# Patient Record
Sex: FEMALE | Race: BLACK OR AFRICAN AMERICAN | URBAN - METROPOLITAN AREA
[De-identification: names, ages, dates, MRNs, and addresses within clinical notes are randomized per-mention and may not be internally consistent; named-entity substitution may affect disease eponyms.]

---

## 2017-03-30 LAB
ANTIBODY SCREEN, EXTERNAL: NEGATIVE
HBSAG, EXTERNAL: NEGATIVE
HIV, EXTERNAL: NEGATIVE
RPR, EXTERNAL: NORMAL
RUBELLA, EXTERNAL: NORMAL
TYPE, ABO & RH, EXTERNAL: NORMAL

## 2017-04-19 ENCOUNTER — HOSPITAL ENCOUNTER (EMERGENCY)
Age: 37
Discharge: HOME | End: 2017-04-19
Attending: STUDENT IN AN ORGANIZED HEALTH CARE EDUCATION/TRAINING PROGRAM
Payer: COMMERCIAL

## 2017-04-19 VITALS
BODY MASS INDEX: 28.93 KG/M2 | HEART RATE: 88 BPM | SYSTOLIC BLOOD PRESSURE: 111 MMHG | OXYGEN SATURATION: 97 % | RESPIRATION RATE: 16 BRPM | TEMPERATURE: 97.9 F | HEIGHT: 66 IN | WEIGHT: 180 LBS | DIASTOLIC BLOOD PRESSURE: 67 MMHG

## 2017-04-19 DIAGNOSIS — R10.84 ABDOMINAL PAIN, GENERALIZED: Primary | ICD-10-CM

## 2017-04-19 PROCEDURE — 99282 EMERGENCY DEPT VISIT SF MDM: CPT
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
## 2017-04-19 NOTE — DISCHARGE INSTRUCTIONS
We hope that we have addressed all of your medical concerns. The examination and treatment you received in the Emergency Department were for an emergent problem and were not intended as complete care. It is important that you follow up with your healthcare provider(s) for ongoing care. If your symptoms worsen or do not improve as expected, and you are unable to reach your usual health care provider(s), you should return to the Emergency Department. Today's healthcare is undergoing tremendous change, and patient satisfaction surveys are one of the many tools to assess the quality of medical care. You may receive a survey from the [employer] regarding your experience in the Emergency Department. I hope that your experience has been completely positive, particularly the medical care that I provided. As such, please participate in the survey; anything less than excellent does not meet my expectations or intentions. [facility] and [address] [place] [address] participate in nationally recognized quality of care measures. If your blood pressure is greater than 120/80, as reported below, we urge that you seek medical care to address the potential of high blood pressure, commonly known as hypertension. Hypertension can be hereditary or can be caused by certain medical conditions, pain, stress, or \"white coat syndrome. \"       Please make an appointment with your health care provider(s) for follow up of your Emergency Department visit. VITALS:   Patient Vitals for the past 8 hrs:   Temp Pulse Resp BP SpO2   04/19/17 1300 97.9 °F (36.6 °C) 88 16 111/67 97 %          Thank you for allowing us to provide you with medical care today. We realize that you have many choices for your emergency care needs. Please choose us in the future for any continued health care needs. Sanchez Ellis  [place], [address] [place] [address] [address]   Office: [phone]            No results found for this or any previous visit (from the past 24 hour(s)). No results found. Belly Pain in Pregnancy: Care Instructions  Your Care Instructions  When you're pregnant, any belly pain can be a worry. You may not want to call your doctor about every pain you have. But you don't want to miss something that is dangerous for you or your baby. Even if it feels familiar, belly pain can mean something new when you're pregnant. It's important to know when to call your doctor. It will also help to know how to care for yourself at home when your pain is not caused by anything harmful. · When belly pain is more severe or constant, see a doctor right away. · If you're sure your belly pain is a sign of labor, call your doctor. · When belly pain is brief, it's usually a normal part of pregnancy. It might be related to changes in the growing uterus. Or it could be the stretching of ligaments called round ligaments. These ligaments help support the uterus. Round ligament pain can be on either side of your belly. It can also be felt in your hips or groin. Follow-up care is a key part of your treatment and safety. Be sure to make and go to all appointments, and call your doctor if you are having problems. It's also a good idea to know your test results and keep a list of the medicines you take. How can you tell if belly pain is a sign of labor? When belly pain is caused by labor, it can feel like mild or menstrual-like cramps in your lower belly. These cramps are probably contractions. They can happen in your second or third trimester. You may also have:  · A steady, dull ache in your lower back, pelvis, or thighs. · A feeling of pressure in your pelvis or lower belly. · Changes in your vaginal discharge or a sudden release of fluid from the vagina. If you think you are in labor, call your doctor. How can you care for yourself at home?   When belly pain is mild and is not a symptom of labor:  · Rest until you feel better. · Take a warm bath. · Think about what you drink and eat:  ¨ Drink plenty of fluids. Choose water and other caffeine-free clear liquids until you feel better. ¨ Try eating small, frequent meals. If your stomach is upset, try bland, low-fat foods like plain rice, broiled chicken, toast, and yogurt. · Think about how you move if you are having brief pains from stretching of the round ligaments. ¨ Try gentle stretching. ¨ Move a little more slowly when turning in bed or getting up from a chair, so those ligaments don't stretch quickly. ¨ Lean forward a bit if you think you are going to cough or sneeze. When should you call for help? Call 911 anytime you think you may need emergency care. For example, call if:  · You have sudden, severe pain in your belly. · You have severe vaginal bleeding. Call your doctor now or seek immediate medical care if:  · You have new or worse belly pain or cramping. · You have any vaginal bleeding. · You have a fever. · You have symptoms of preeclampsia, such as:  ¨ Sudden swelling of your face, hands, or feet. ¨ New vision problems (such as dimness or blurring). ¨ A severe headache. · You think that you may be in labor. This means that you've had at least 8 contractions within 1 hour or at least 4 contractions within 20 minutes, even after you change your position and drink fluids. · You have symptoms of a urinary tract infection. These may include:  ¨ Pain or burning when you urinate. ¨ A frequent need to urinate without being able to pass much urine. ¨ Pain in the flank, which is just below the rib cage and above the waist on either side of the back. ¨ Blood in your urine. Watch closely for changes in your health, and be sure to contact your doctor if you are worried about your or your baby's health. Where can you learn more? Go to http://danette-ben.info/.   Enter [phone] in the search box to learn more about \"Belly Pain in Pregnancy: Care Instructions. \"  Current as of: June 8, 2016  Content Version: 11.2  © 4679-6268 [employer], [employer]. Care instructions adapted under license by [employer] (which disclaims liability or warranty for this information). If you have questions about a medical condition or this instruction, always ask your healthcare professional. [address] any warranty or liability for your use of this information.

## 2017-05-11 ENCOUNTER — HOSPITAL ENCOUNTER (INPATIENT)
Age: 37
LOS: 1 days | Discharge: HOME | DRG: 778 | End: 2017-05-12
Attending: OBSTETRICS & GYNECOLOGY | Admitting: OBSTETRICS & GYNECOLOGY
Payer: COMMERCIAL

## 2017-05-11 LAB
ABO + RH BLD: NORMAL
BLOOD GROUP ANTIBODIES SERPL: NORMAL
ERYTHROCYTE [DISTWIDTH] IN BLOOD BY AUTOMATED COUNT: 13.9 % (ref 11.5–14.5)
HCT VFR BLD AUTO: 36.7 % (ref 35–47)
HGB BLD-MCNC: 12.3 G/DL (ref 11.5–16)
MCH RBC QN AUTO: 28.7 PG (ref 26–34)
MCHC RBC AUTO-ENTMCNC: 33.5 G/DL (ref 30–36.5)
MCV RBC AUTO: 85.7 FL (ref 80–99)
PLATELET # BLD AUTO: 289 K/UL (ref 150–400)
RBC # BLD AUTO: 4.28 M/UL (ref 3.8–5.2)
SPECIMEN EXP DATE BLD: NORMAL
WBC # BLD AUTO: 12 K/UL (ref 3.6–11)

## 2017-05-11 PROCEDURE — 86900 BLOOD TYPING SEROLOGIC ABO: CPT | Performed by: OBSTETRICS & GYNECOLOGY

## 2017-05-11 PROCEDURE — 65410000002 HC RM PRIVATE OB

## 2017-05-11 PROCEDURE — 36415 COLL VENOUS BLD VENIPUNCTURE: CPT | Performed by: OBSTETRICS & GYNECOLOGY

## 2017-05-11 PROCEDURE — 85027 COMPLETE CBC AUTOMATED: CPT | Performed by: OBSTETRICS & GYNECOLOGY

## 2017-05-11 PROCEDURE — 74011250636 HC RX REV CODE- 250/636: Performed by: OBSTETRICS & GYNECOLOGY

## 2017-05-11 RX ORDER — ONDANSETRON 4 MG/1
4 TABLET, ORALLY DISINTEGRATING ORAL
Status: DISCONTINUED | OUTPATIENT
Start: 2017-05-11 | End: 2017-05-12 | Stop reason: HOSPADM

## 2017-05-11 RX ORDER — ACETAMINOPHEN 325 MG/1
975 TABLET ORAL
Status: DISCONTINUED | OUTPATIENT
Start: 2017-05-11 | End: 2017-05-12 | Stop reason: HOSPADM

## 2017-05-11 RX ORDER — SODIUM CHLORIDE 0.9 % (FLUSH) 0.9 %
SYRINGE (ML) INJECTION
Status: DISPENSED
Start: 2017-05-11 | End: 2017-05-12

## 2017-05-11 RX ORDER — SODIUM CHLORIDE 0.9 % (FLUSH) 0.9 %
SYRINGE (ML) INJECTION
Status: COMPLETED
Start: 2017-05-11 | End: 2017-05-11

## 2017-05-11 RX ORDER — LEVOTHYROXINE SODIUM 137 UG/1
100 TABLET ORAL
COMMUNITY

## 2017-05-11 RX ORDER — SODIUM CHLORIDE, SODIUM LACTATE, POTASSIUM CHLORIDE, CALCIUM CHLORIDE 600; 310; 30; 20 MG/100ML; MG/100ML; MG/100ML; MG/100ML
125 INJECTION, SOLUTION INTRAVENOUS CONTINUOUS
Status: DISCONTINUED | OUTPATIENT
Start: 2017-05-11 | End: 2017-05-11

## 2017-05-11 RX ADMIN — Medication 10 ML: at 15:12

## 2017-05-11 RX ADMIN — SODIUM CHLORIDE, SODIUM LACTATE, POTASSIUM CHLORIDE, AND CALCIUM CHLORIDE 125 ML/HR: 600; 310; 30; 20 INJECTION, SOLUTION INTRAVENOUS at 12:37

## 2017-05-11 NOTE — IP AVS SNAPSHOT
Current Discharge Medication List  
  
CONTINUE these medications which have NOT CHANGED Dose & Instructions Dispensing Information Comments Morning Noon Evening Bedtime  
 levothyroxine 137 mcg tablet Commonly known as:  SYNTHROID Your last dose was: Your next dose is:    
   
   
 Dose:  100 mcg Take 100 mcg by mouth Daily (before breakfast). Indications: hypothyroidism Refills:  0 PRENATAL PLUS (CALCIUM CARB) 27 mg iron- 1 mg Tab Generic drug:  prenatal vit-calcium-iron-fa Your last dose was: Your next dose is:    
   
   
 Dose:  1 Tab Take 1 Tab by mouth daily. Indications: Pregnancy Refills:  0

## 2017-05-11 NOTE — PROGRESS NOTES
TRANSFER - IN REPORT:    Verbal report received from ANGELO Barron RN(name) on [employer]  being received from L&D(unit) for routine progression of care      Report consisted of patients Situation, Background, Assessment and   Recommendations(SBAR). Information from the following report(s) SBAR, Intake/Output, MAR and Recent Results was reviewed with the receiving nurse. Opportunity for questions and clarification was provided. Assessment completed upon patients arrival to unit and care assumed. 1455  Patient returned to room 315 from Dr. Aiyana Wilhelm office via wheelchair. 1500  Patient admitted to Antepartum unit. Call bell at bedside. All questions answered. Awaiting tray from dietary.  on his way back to be with patient (went to get food). Hourly rounds were completed on this patient [phone], [phone].

## 2017-05-11 NOTE — PROGRESS NOTES
1130: Patient arrived from office with vaginal bleeding. FHR was 106 in office. Assessing now. 1245: Dr. Jacobs Reasons in room to see patient. Ultrasound performed. FHR visible, around 100bpm. No amniotic fluid seen. Verbal orders to transfer to APU and continue to watch bleeding. Verbal orders to eat something light. 1340: Patient to Dr. Lisa Sullivan office for ultrasound, then to APU.     1415: TRANSFER - OUT REPORT:    Telephone report given to JOSE Doran (name) on [address]  being transferred to APU (unit) for routine progression of care       Report consisted of patients Situation, Background, Assessment and   Recommendations(SBAR). Information from the following report(s) SBAR, Procedure Summary, Intake/Output, MAR and Recent Results was reviewed with the receiving nurse.

## 2017-05-11 NOTE — CONSULTS
Maternal-Fetal Medicine    Indication: Antepartum hemorrhage, unspecified, first trimester O46.91, AMA Multigravida 1st Tri 009.521, Recurrent Preg. Loss (preg) 1st Tri  O26.21.   ____________________________________________________________________________  History: Age: 42 years. Maternal age at [facility]: 42 years. ____________________________________________________________________________  Dating:  Stated EDC:  EDC: 11/11/17 GA by stated EDC: 13w5d  Current Scan on: 05/11/17 EDC: 11/15/17 GA by current scan: 13w1d  Best Overall Assessment: 05/11/17 EDC: 11/11/17 Assessed GA: 13w5d  The calculation of the gestational age by current scan was based on [address]. The Best Overall Assessment is based on the stated EDC.  ____________________________________________________________________________  First Trimester Scan:  Rizvi gestation. Biometry:  CRL 68.7 mm  - 13w1d (12w0d to 14w1d)     Fetal heart activity: present. Fetal heart rate: 168 bpm.     Summary of Ultrasound Findings:  U/S machine: Virdiaon E8 Expert.     ____________________________________________________________________________  Report Summary:  Impression:   Ms Yomi Hernandez, [facility] at 13-weeks gestation, is admitted with vaginal bleeding. On bedside ultrasound, intermittent bradycardia and decreased or no amniotic fluid was seen. Obstetric history is significant for a term vaginal delivery 22 years ago. Recently, she had a 16-week loss followed by 4 early miscarriages. She reports 2 of them could be chemical pregnancies. Patient has hypothyroidism and takes supplements. On ultrasound, amniotic fluid is decreased. A single intrauterine pregnancy is confirmed. Fetal heart rate ranges from 167 to 175 bpm. No episodes of bradycardia were seen. The placenta is anterior and there is no subchorionic hematoma. The [address] measurement is consistent with her previously-established dates. I informed the couple of the findings.  I explained the poor prognosis of continuing the pregnancy to viability because of low or absent amniotic fluid. The likelihood of miscarriage increases with vaginal bleeding. Her hematocrit is 36.7. If the bleeding is profuse or continuous, I recommend termination of pregnancy to prevent adverse maternal outcomes. If bleeding stops, the patient may be discharged and followed up in a week or as clinically indicated. A 24-hour observation as inpatient is reasonable. Informed Dr. Arlene Cuevas

## 2017-05-11 NOTE — H&P
History & Physical    Name: Swetha Wiseman MRN: [id number]  SSN: [id number]    YOB: 1980  Age: 42 y.o. Sex: female      Subjective:     Reason for Admission:  Pregnancy and bleeding    History of Present Illness: Ms. Alia Cruz is a 42 y.o. female with an estimated gestational age of 12w10d with Estimated Date of Delivery: 17. Patient complains of large amount of bleeding with clots this morning - since admission has had scant bleeding. Mild cramps now only. OB History    Para Term  AB SAB TAB Ectopic Multiple Living   7 1 1  1 5    1      # Outcome Date GA Lbr Emmett/2nd Weight Sex Delivery Anes PTL Lv   7 Current            6 SAB            5 SAB            4 SAB            3 SAB            2 SAB  16 wks          1 Term 18 yrs ago                Past Medical History:   Diagnosis Date    Thyroid activity decreased      Past Surgical History:   Procedure Laterality Date    HX OTHER SURGICAL      D&C  - retained placenta     Social History     Occupational History    Not on file. Social History Main Topics    Smoking status: Never Smoker    Smokeless tobacco: Not on file    Alcohol use No    Drug use: No    Sexual activity: Yes      No family history on file. Allergies   Allergen Reactions    Penicillins Unknown (comments)     Told by mother she was allergic     Prior to Admission medications    Medication Sig Start Date End Date Taking? Authorizing Provider   levothyroxine (SYNTHROID) 137 mcg tablet Take 100 mcg by mouth Daily (before breakfast). Indications: hypothyroidism   Yes Historical Provider   prenatal vit-calcium-iron-fa (PRENATAL PLUS, CALCIUM CARB,) 27 mg iron- 1 mg tab Take 1 Tab by mouth daily. Indications: Pregnancy   Yes Historical Provider        Review of Systems:  A comprehensive review of systems was negative except for that written in the History of Present Illness.      Objective:     Vitals:    Vitals:    17 1140 17 1200 17 1500   BP:  120/76 112/71   Pulse:  68 69   Resp:  16 16   Temp:  98.1 °F (36.7 °C) 98.5 °F (36.9 °C)   Weight: 84.8 kg (187 lb)     Height: 5' 6\" (1.676 m)        Temp (24hrs), Av.3 °F (36.8 °C), Min:98.1 °F (36.7 °C), Max:98.5 °F (36.9 °C)    BP  Min: 112/71  Max: 120/76     Physical Exam:  Patient without distress. Heart: Regular rate and rhythm  Lung: clear to auscultation throughout lung fields, no wheezes, no rales, no rhonchi and normal respiratory effort  Abdomen: soft, nontender  Fundus: soft and non tender  Perineum: blood present, amniotic fluid absent  Cervical Exam: ext os 1cm in office/no effacement; 100cc clot evacuated in office  Lower Extremities:  - Edema No     Membranes:  No amniotic fluid on US  Uterine Activity:  none  Fetal Heart Rate:  +FHT on US       Lab/Data Review:  Recent Results (from the past 24 hour(s))   CBC W/O DIFF    Collection Time: 17 12:35 PM   Result Value Ref Range    WBC 12.0 (H) 3.6 - 11.0 K/uL    RBC 4.28 3.80 - 5.20 M/uL    HGB 12.3 11.5 - 16.0 g/dL    HCT 36.7 35.0 - 47.0 %    MCV 85.7 80.0 - 99.0 FL    MCH 28.7 26.0 - 34.0 PG    MCHC 33.5 30.0 - 36.5 g/dL    RDW 13.9 11.5 - 14.5 %    PLATELET 266 922 - 027 K/uL   TYPE & SCREEN    Collection Time: 17 12:35 PM   Result Value Ref Range    Crossmatch Expiration 2017     ABO/Rh(D) O POSITIVE     Antibody screen NEG        Assessment and Plan: Active Problems:    * No active hospital problems. *     [id number] at 13w5d with threatened . Poor prognosis due to no amniotic fluid. Significant bleeding this morning so recommend observation overnight. Pt would prefer vaginal delivery of miscarriage when occurs but consents to a D&C if bleeding is emergent or indicated. Accepts blood products if necessary.     Signed By:  Carmita Ambrocio MD     May 11, 2017

## 2017-05-12 VITALS
RESPIRATION RATE: 16 BRPM | SYSTOLIC BLOOD PRESSURE: 102 MMHG | HEIGHT: 66 IN | HEART RATE: 84 BPM | DIASTOLIC BLOOD PRESSURE: 60 MMHG | BODY MASS INDEX: 30.05 KG/M2 | WEIGHT: 187 LBS | TEMPERATURE: 98.5 F

## 2017-05-12 PROCEDURE — 99284 EMERGENCY DEPT VISIT MOD MDM: CPT

## 2017-05-12 PROCEDURE — 76815 OB US LIMITED FETUS(S): CPT

## 2017-05-12 NOTE — DISCHARGE SUMMARY
Obstetrical Discharge Summary     Name: Selena Andres MRN: [id number]  SSN: [id number]    YOB: 1980  Age: 42 y.o. Sex: female      Admit Date: 2017    Discharge Date: 2017     Admitting Physician: Chase Villanueva MD     Attending Physician:  Chase Villanueva MD     * Admission Diagnoses:13 weeks pregnancy, bleeding, threatened   * Discharge Diagnoses: 13 weeks pregnant  Additional Diagnoses:    Lab Results   Component Value Date/Time    ABO/Rh(D) O POSITIVE 2017 12:35 PM    Rubella, External Immune 2017    ABO,Rh O Positive 2017      There is no immunization history on file for this patient. * Procedures: none  * No surgery found *           * Discharge Condition: good    [facility] Course:Patient admitted at 13 weeks with significant bleeding which stopped after observation. Viable fetus on discharge with subjectively low amniotic fluid. * Disposition: Home    Discharge Medications:   Current Discharge Medication List      CONTINUE these medications which have NOT CHANGED    Details   levothyroxine (SYNTHROID) 137 mcg tablet Take 100 mcg by mouth Daily (before breakfast). Indications: hypothyroidism      prenatal vit-calcium-iron-fa (PRENATAL PLUS, CALCIUM CARB,) 27 mg iron- 1 mg tab Take 1 Tab by mouth daily. Indications: Pregnancy             * Follow-up Care/Patient Instructions: Activity: no sex/tampons/baths/swimming. No lifting more than 10 pounds. Advise patient to be out of work and only do light activity.   Diet: Regular Diet      Follow-up Information     Follow up With Details Comments [address] Anabelle Barkley MD Appointment to be made for 2017  [address] [place] [address]  [phone]             Signed By:  Chase Villanueva MD     May 12, 2017

## 2017-05-12 NOTE — PROGRESS NOTES
Bedside shift change report given to DOT Gutierrez RN (oncoming nurse) by Christina Handy RN (offgoing nurse). Report included the following information SBAR, MAR, Accordion and Recent Results.        Hourly rounds completed: [id number], [phone], [phone]

## 2017-05-12 NOTE — PROGRESS NOTES
Bedside and Verbal shift change report given to JOSE Doran RN (oncoming nurse) by DOT Blackmon RN (offgoing nurse). Report included the following information SBAR, MAR and Recent Results. 6159  Discharge instructions given and reviewed with patient. All questions answered.  here to take patient home. Return appointment is on next Thursday. Hourly rounds were completed on this patient [phone].

## 2017-05-12 NOTE — DISCHARGE INSTRUCTIONS
Threatened Miscarriage: Care Instructions  Your Care Instructions    Some women have light spotting or bleeding during the first 12 weeks of pregnancy. In some cases this is normal. Light spotting or bleeding can also be a sign of a possible loss of the pregnancy. This is called a threatened miscarriage. At this point, the doctor may not be able to tell if your vaginal bleeding is normal or is a sign of a miscarriage. In early pregnancy, things such as stress, exercise, and sex do not cause miscarriage. You may be worried or upset about the possibility of losing your pregnancy. But do not blame yourself. There is no treatment to stop a threatened miscarriage. If you do have a miscarriage, there was nothing you could have done to prevent it. A miscarriage usually means that the pregnancy is not developing normally. The doctor has checked you carefully, but problems can develop later. If you notice any problems or new symptoms, get medical treatment right away. Follow-up care is a key part of your treatment and safety. Be sure to make and go to all appointments, and call your doctor if you are having problems. It's also a good idea to know your test results and keep a list of the medicines you take. How can you care for yourself at home? · If you do have a miscarriage, you will probably have some vaginal bleeding for 1 to 2 weeks. Use pads instead of tampons. · Take acetaminophen (Tylenol) for cramps. Read and follow all instructions on the label. · Do not take two or more pain medicines at the same time unless the doctor told you to. Many pain medicines have acetaminophen, which is Tylenol. Too much acetaminophen (Tylenol) can be harmful. · Do not have sex until your doctor says it is okay. · Get lots of rest over the next several days. · You may do your normal activities if you feel well enough to do them. But do not do any heavy exercise until your doctor says it is okay.   · Eat a balanced diet that is high in iron and vitamin C. Foods rich in iron include red meat, shellfish, eggs, beans, and leafy green vegetables. Foods high in vitamin C include citrus fruits, tomatoes, and broccoli. Talk to your doctor about whether you need to take iron pills or a multivitamin. · Do not drink alcohol or use tobacco or illegal drugs. · Do not smoke. If you need help quitting, talk to your doctor about stop-smoking programs and medicines. These can increase your chances of quitting for good. When should you call for help? Call 911 anytime you think you may need emergency care. For example, call if:  · You have sudden, severe pain in your belly or pelvis. · You passed out (lost consciousness). · You have severe vaginal bleeding. Call your doctor now or seek immediate medical care if:  · You are dizzy or lightheaded, or you feel like you may faint. · You have new or increased pain in your belly or pelvis. · Your vaginal bleeding is getting worse. · You have increased pain in the vaginal area. · You have a fever. · You think you may have passed tissue. Save any tissue that you pass. Take it to your doctor's office as soon as you can. Watch closely for changes in your health, and be sure to contact your doctor if:  · You have new or worse vaginal discharge. · You do not get better as expected. Where can you learn more? Go to http://danette-ben.info/. Enter Q913 in the search box to learn more about \"Threatened Miscarriage: Care Instructions. \"  Current as of: May 30, 2016  Content Version: 11.2  © 3398-7328 [employer]. Care instructions adapted under license by [employer] (which disclaims liability or warranty for this information). If you have questions about a medical condition or this instruction, always ask your healthcare professional. [address] any warranty or liability for your use of this information.     Patient is discharged home to be on modified bedrest until follow up visit. No heavy lifting, no tampons, no sex, until office visit. Encouraged not to return to work until cleared by Dr. Hale Bloch. If increased bleeding, cramping occurs, patient is to call Dr. Candice Hazel office. Return appointment is next Thursday. If patient has any concerns/questions, she is to call Dr. Candice Hazel office. mobile mumhart Activation    Thank you for requesting access to [email or web address]. Please follow the instructions below to securely access and download your online medical record. [email or web address] allows you to send messages to your doctor, view your test results, renew your prescriptions, schedule appointments, and more. How Do I Sign Up? 1. In your internet browser, go to [email or web address]  2. Click on the First Time User? Click Here link in the Sign In box. You will be redirect to the New Member Sign Up page. 3. Enter your [email or web address] Access Code exactly as it appears below. You will not need to use this code after youve completed the sign-up process. If you do not sign up before the expiration date, you must request a new code. [email or web address] Access Code: Activation code not generated  Current [email or web address] Status: Patient Declined (This is the date your [email or web address] access code will )    4. Enter the last four digits of your Social Security Number (xxxx) and Date of Birth (mm/dd/yyyy) as indicated and click Submit. You will be taken to the next sign-up page. 5. Create a [email or web address] ID. This will be your [email or web address] login ID and cannot be changed, so think of one that is secure and easy to remember. 6. Create a [email or web address] password. You can change your password at any time. 7. Enter your Password Reset Question and Answer. This can be used at a later time if you forget your password. 8. Enter your e-mail address. You will receive e-mail notification when new information is available in [address] 9. Click Sign Up. You can now view and download portions of your medical record.   10. Click the [facility] link to download a portable copy of your medical information. Additional Information    If you have questions, please visit the Frequently Asked Questions section of the [employer] website at [email or web address] Remember, [employer] is NOT to be used for urgent needs. For medical emergencies, dial 911.

## 2017-05-12 NOTE — PROGRESS NOTES
High Risk Obstetrics Progress Note    Name: Anton Kemp MRN: [id number]  SSN: [id number]    YOB: 1980  Age: 42 y.o. Sex: female      Subjective:      LOS: 1 day    Estimated Date of Delivery: 17   Gestational Age Today: 14w11d     Patient admitted for bleeding. States she has had no symptoms overnight. Objective:     Vitals:  Blood pressure 99/57, pulse 77, temperature 98.2 °F (36.8 °C), resp. rate 16, height 5' 6\" (1.676 m), weight 84.8 kg (187 lb), not currently breastfeeding. Temp (24hrs), Av.3 °F (36.8 °C), Min:98.1 °F (36.7 °C), Max:98.5 °F (43.1 °C)    Systolic (18YRJ), MZU:407 , Min:99 , SZY:374      Diastolic (80FMB), AIC:48, Min:57, Max:76       Intake and Output:          Physical Exam:  Patient without distress. Abdomen: soft, nontender       Bedside US: +fht - SUBJECTIVELY NORMAL; SCANT FLUID. Labs:   Recent Results (from the past 36 hour(s))   CBC W/O DIFF    Collection Time: 17 12:35 PM   Result Value Ref Range    WBC 12.0 (H) 3.6 - 11.0 K/uL    RBC 4.28 3.80 - 5.20 M/uL    HGB 12.3 11.5 - 16.0 g/dL    HCT 36.7 35.0 - 47.0 %    MCV 85.7 80.0 - 99.0 FL    MCH 28.7 26.0 - 34.0 PG    MCHC 33.5 30.0 - 36.5 g/dL    RDW 13.9 11.5 - 14.5 %    PLATELET 151 467 - 847 K/uL   TYPE & SCREEN    Collection Time: 17 12:35 PM   Result Value Ref Range    Crossmatch Expiration 2017     ABO/Rh(D) O POSITIVE     Antibody screen NEG        Assessment and Plan: Active Problems:    * No active hospital problems. *     13W6D THREATENED . DISCHARGE HOME ON MODIFIED BEDREST, STRICT PELVIC REST. BLEEDING PRECAUTIONS. F/U NEXT WEEK.     Signed By: Arcadio Paulino MD     May 12, 2017

## 2017-05-15 ENCOUNTER — HOSPITAL ENCOUNTER (EMERGENCY)
Age: 37
Discharge: HOME | End: 2017-05-15
Attending: STUDENT IN AN ORGANIZED HEALTH CARE EDUCATION/TRAINING PROGRAM
Payer: COMMERCIAL

## 2017-05-15 VITALS
RESPIRATION RATE: 18 BRPM | HEART RATE: 80 BPM | WEIGHT: 185 LBS | DIASTOLIC BLOOD PRESSURE: 53 MMHG | BODY MASS INDEX: 29.73 KG/M2 | OXYGEN SATURATION: 100 % | HEIGHT: 66 IN | TEMPERATURE: 98.9 F | SYSTOLIC BLOOD PRESSURE: 104 MMHG

## 2017-05-15 DIAGNOSIS — O03.9 SPONTANEOUS MISCARRIAGE: Primary | ICD-10-CM

## 2017-05-15 PROCEDURE — 88305 TISSUE EXAM BY PATHOLOGIST: CPT | Performed by: STUDENT IN AN ORGANIZED HEALTH CARE EDUCATION/TRAINING PROGRAM

## 2017-05-15 PROCEDURE — 99284 EMERGENCY DEPT VISIT MOD MDM: CPT

## 2017-05-15 PROCEDURE — 74011250637 HC RX REV CODE- 250/637: Performed by: NURSE PRACTITIONER

## 2017-05-15 RX ORDER — IBUPROFEN 600 MG/1
600 TABLET ORAL
Qty: 40 TAB | Refills: 0 | Status: SHIPPED | OUTPATIENT
Start: 2017-05-15 | End: 2017-05-25

## 2017-05-15 RX ORDER — IBUPROFEN 600 MG/1
600 TABLET ORAL
Status: COMPLETED | OUTPATIENT
Start: 2017-05-15 | End: 2017-05-15

## 2017-05-15 RX ORDER — ACETAMINOPHEN 325 MG/1
650 TABLET ORAL
Status: DISCONTINUED | OUTPATIENT
Start: 2017-05-15 | End: 2017-05-15

## 2017-05-15 RX ADMIN — IBUPROFEN 600 MG: 600 TABLET, FILM COATED ORAL at 13:26

## 2017-05-15 NOTE — PROGRESS NOTES
Spiritual Care Assessment/Progress Notes    Morena Mail [id number]  [id number]    1980  42 y.o.  female    Patient Telephone Number: [phone] (home)   Buddhist Affiliation: Josep Scott   Language: English   Extended Emergency Contact Information  Primary Emergency Contact: Dustin Phone: [phone]  Relation: Spouse   There are no active problems to display for this patient. Date: 5/15/2017       Level of Buddhist/Spiritual Activity:  []         Involved in alyse tradition/spiritual practice    []         Not involved in alyse tradition/spiritual practice  []         Spiritually oriented    []         Claims no spiritual orientation    []         seeking spiritual identity  []         Feels alienated from Muslim practice/tradition  []         Feels angry about Muslim practice/tradition  []         Spirituality/Muslim tradition is a resource for coping at this time.   [x]         Not able to assess     Services Provided Today:  []         crisis intervention    []         reading Scriptures  []         spiritual assessment    []         prayer  [x]         empathic listening/emotional support  []         rites and rituals (cite in comments)  []         life review     []         Muslim support  []         theological development   []         advocacy  []         ethical dialog     []         blessing  []         bereavement support    []         support to family  []         anticipatory grief support   []         help with AMD  []         spiritual guidance    []         meditation      Spiritual Care Needs  []         Emotional Support  []         Spiritual/Buddhist Care  []         Loss/Adjustment  []         Advocacy/Referral                /Ethics  [x]         No needs expressed at               this time  []         Other: (note in               comments)  [address]  []         Follow up visits with               pt/family  [] Provide materials  []         Schedule sacraments  []         Contact Community               Clergy  [x]         Follow up as needed  []         Other: (note in               comments)     Comments: [facility] is responding to staff referral for pt in ER 15. Pt declines visit at this time. Spiritual care will continue to follow as able and as needed.      [address] M.Div, M.S, [address] available at [phone])

## 2017-05-15 NOTE — ED TRIAGE NOTES
Starting vaginal bleeding/cramping this morning. Seen at [facility] last week. Ob is Velia.  MD

## 2017-05-15 NOTE — LETTER
NOTIFICATION RETURN TO WORK / SCHOOL 
 
5/15/2017 1:23 PM 
 
Ms. Danielle Logan [address] [facility] [address] To Whom It May Concern: 
 
Danielle Logan is currently under the care of [facility] EMERGENCY DEPT. She will return to work/school on: May 30, 2017 If there are questions or concerns please have the patient contact our office. Sincerely, Alex Dias NP 
05/15/17 
1:23 PM

## 2017-05-15 NOTE — ED PROVIDER NOTES
HPI Comments: Patient is a 42year old female who was recently (may 11, 2017) at [facility] with a threatened miscarriage at 14 weeks. She was monitored overnight at discharged when the bleeding slowed. She was told she would likely pass the POC at home and was discharged home. This morning the bleeding increased again. She began passing tissue and was brought to the Emergency Room. She states she is having crampy low abdominal pain. This is her 6th miscarriage in 2 years. She had a towel between her legs with tissue on the towel. In the past she has needed a D&C following her miscarriages. OB/GYN: Dr. [facility]. Past Medical History:  Hypothyroid        Patient is a 42 y.o. female presenting with vaginal bleeding and miscarriage. The history is provided by the patient. Vaginal Bleeding   Nothing aggravates the symptoms. Nothing relieves the symptoms. Miscarriage    The current episode started more than 2 days ago. The problem has been rapidly worsening. The pain is located in the suprapubic region. The pain is at a severity of 6/10. Past Medical History:   Diagnosis Date    Thyroid activity decreased        Past Surgical History:   Procedure Laterality Date    HX OTHER SURGICAL      D&C 2015         History reviewed. No pertinent family history. Social History     Social History    Marital status:      Spouse name: N/A    Number of children: N/A    Years of education: N/A     Occupational History    Not on file. Social History Main Topics    Smoking status: Never Smoker    Smokeless tobacco: Not on file    Alcohol use No    Drug use: No    Sexual activity: Yes     Other Topics Concern    Not on file     Social History Narrative         ALLERGIES: Penicillins    Review of Systems   Constitutional: Negative. HENT: Negative. Eyes: Negative. Respiratory: Negative. Cardiovascular: Negative. Gastrointestinal: Negative.     Endocrine: Negative. Genitourinary: Positive for vaginal bleeding. Musculoskeletal: Negative. Allergic/Immunologic: Negative. Neurological: Negative. Hematological: Negative. Psychiatric/Behavioral: Negative. Vitals:    05/15/17 1150 05/15/17 1315   BP: 100/78 104/53   Pulse: 80    Resp: 18    Temp: 98.9 °F (37.2 °C)    SpO2: 99% 100%   Weight: 83.9 kg (185 lb)    Height: 5' 6\" (1.676 m)             Physical Exam   Constitutional: She appears well-developed and well-nourished. Cardiovascular: Normal rate and regular rhythm. Genitourinary: Pelvic exam was performed with patient supine. Vaginal discharge found. Genitourinary Comments: Significant tissue loss with hemorrhage at the vaginal introitus. Speculum exam showed only bleeding. No additional tissue in the canal. OS not appreciated. Nursing note and vitals reviewed. MDM  Number of Diagnoses or Management Options  Spontaneous miscarriage:   Diagnosis management comments: Miscarriage:   14 weeks  POC retrieved and placed in container. Will send for genetics testing per patient request    Ibuprofen given for crampy abdominal pain. DC home with spouse  Follow-up on Wednesday with Dr. Shreyas Garibay NP  05/15/17  1:42 PM      ED Course       Pelvic Exam  Date/Time: 5/15/2017 12:00 PM  Performed by: NP  Procedure duration:  3 minutes. Exam assisted by:  RN. Type of exam performed: speculum. External genitalia appearance: normal.    Vaginal exam:  bleeding. Bleeding: moderate  Cervical exam:  not evaluated. Specimen(s) collected:  products of conception.   Patient tolerance: Patient tolerated the procedure well with no immediate complications

## 2017-05-15 NOTE — DISCHARGE INSTRUCTIONS
We hope that we have addressed all of your medical concerns. The examination and treatment you received in the Emergency Department were for an emergent problem and were not intended as complete care. It is important that you follow up with your healthcare provider(s) for ongoing care. If your symptoms worsen or do not improve as expected, and you are unable to reach your usual health care provider(s), you should return to the Emergency Department. Today's healthcare is undergoing tremendous change, and patient satisfaction surveys are one of the many tools to assess the quality of medical care. You may receive a survey from the [facility] regarding your experience in the Emergency Department. I hope that your experience has been completely positive, particularly the medical care that I provided. As such, please participate in the survey; anything less than excellent does not meet my expectations or intentions. [facility] and [address] [place] [address] participate in nationally recognized quality of care measures. If your blood pressure is greater than 120/80, as reported below, we urge that you seek medical care to address the potential of high blood pressure, commonly known as hypertension. Hypertension can be hereditary or can be caused by certain medical conditions, pain, stress, or \"white coat syndrome. \"       Please make an appointment with your health care provider(s) for follow up of your Emergency Department visit. VITALS:   Patient Vitals for the past 8 hrs:   Temp Pulse Resp BP SpO2   05/15/17 1150 98.9 °F (37.2 °C) 80 18 100/78 99 %          Thank you for allowing us to provide you with medical care today. We realize that you have many choices for your emergency care needs. Please choose us in the future for any continued health care needs. Regards,           Maritza Gaviria, [facility].   Office: [phone]            No results found for this or any previous visit (from the past 24 hour(s)). No results found. Miscarriage: After Your Visit to the Emergency Room  Your Care Instructions  You were seen in the emergency room because you had or are having a miscarriage. The loss of a pregnancy can be very hard. You may wonder why it happened or blame yourself. Miscarriages are common and are not caused by exercise, stress, or sex. Most happen because the fertilized egg in the uterus does not develop normally. There is no treatment that can stop a miscarriage. As long as you do not have heavy blood loss, fever, weakness, or other signs of infection, you can let a miscarriage follow its own course. This can take several days. Your body will recover over the next several weeks. Having a miscarriage does not mean you cannot have a normal pregnancy in the future. Even though you have been released from the emergency room, you still need to watch for any problems. The doctor carefully checked you. But sometimes problems can develop later. If you have new symptoms, or if your symptoms do not get better, return to the emergency room or call your doctor right away. A visit to the emergency room is only one step in your treatment. Even if you feel better, you still need to do what your doctor recommends, such as going to all suggested follow-up appointments and taking medicines exactly as directed. This will help you recover and help prevent future problems. How can you care for yourself at home? · You will probably have vaginal bleeding for 1 to 2 weeks. It may be similar to, or slightly heavier than, a normal period. ¨ Use pads instead of tampons. You may use tampons during your next period, which should start in 3 to 6 weeks. ¨ You may return to your normal activities if you feel well enough to do so--but do not have sex or do heavy exercise until the bleeding stops.   · Take acetaminophen (Tylenol) for cramps. Read and follow all instructions on the label. You may have cramps for several days after the miscarriage. · Do not take two or more pain medicines at the same time unless the doctor told you to. Many pain medicines have acetaminophen, which is Tylenol. Too much acetaminophen (Tylenol) can be harmful. · You may be low in iron because of blood loss. Eat a balanced diet that is high in iron and vitamin C. Foods rich in iron include red meat, shellfish, eggs, beans, and leafy green vegetables. Foods high in vitamin C include citrus fruits, tomatoes, and broccoli. Talk to your doctor or other health care professional about whether you need to take iron pills or a multivitamin. · Talk with your doctor about any future pregnancy plans. Most doctors suggest that you wait until you have had at least one normal period before you try to get pregnant. If you do not want to get pregnant, ask your doctor about birth control options. · It may help to talk with family, friends, or a counselor if you are having trouble dealing with the loss of your pregnancy. If you feel sad or depressed for longer than 2 weeks, talk to a counselor or your doctor. When should you call for help? Call 911 if:  · You passed out (lost consciousness). Return to the emergency room now if:  · You have severe pain in your belly or pelvis. · You have severe vaginal bleeding. You are passing blood clots and soaking through a pad each hour for 2 or more hours. · You are dizzy or lightheaded, or you feel like you may faint. · You have a fever. · You have new or increased pain in your belly or pelvis. Call your doctor today if:  · You pass tissue, not just blood. · You have vaginal discharge that smells bad. · You have bleeding that lasts more than 1 week. · You feel depressed or are not able to function. Where can you learn more?    Go to inGenius Engineering.be  Enter F728 in the search box to learn more about \"Miscarriage: After Your Visit to the Emergency Room. \"   © 5491-5421 Healthwise, Incorporated. Care instructions adapted under license by Nusrat Jones (which disclaims liability or warranty for this information). This care instruction is for use with your licensed healthcare professional. If you have questions about a medical condition or this instruction, always ask your healthcare professional. [address] any warranty or liability for your use of this information. Content Version: 9.4.66847;  Last Revised: December 9, 2010

## 2017-08-01 ENCOUNTER — HOSPITAL ENCOUNTER (OUTPATIENT)
Dept: PERINATAL CARE | Age: 37
Discharge: HOME | End: 2017-08-01

## 2017-08-01 NOTE — CONSULTS
MATERNAL-FETAL MEDICINE    PREPREGNANCY CONSULTATION    Indication: Recurrent pregnancy loss N96; advanceD maternal age.   ____________________________________________________________________________  History: Age: 42 years. : 7 Para: 1. Previous pregnancies: Children born at term: 1. Abortions: 6. Living children: 1. Current Pregnancy: Pre- pregnancy data: Weight 188 lbs. Height 5 ft 6 ins. BMI 30.4. Obstetric History:   Details on previous pregnancies: Living children >=37W: 1. Intrauterine deaths < 14W: 4. Intrauterine deaths 15-23W: 2.  ____________________________________________________________________________  Consultation:  Type of Consultation: Time-based patient evaluation:  45 minutes  Consultant: Dr. Brooke Morales spent more than half of this 45-minute visit at your request providing direct face-to-face counseling for this patient. Ms. Emily Luke is 42 y.o. -American female, G-7, P-1-0-6-1, currently not pregnant and seeking prenatal counseling regarding her history of multiple pregnancy losses with her new partner. Rx:  Levothyroxine, 100 mcg qday (Dr. Teri Martinez)    OBHx:  Her first pregnancy was from partner #1; her 2nd-7th pregnancies are from partner #2  #1 - Term vaginal delivery, no complications, 22 years ago  #2 - 17-week unexplained fetal loss (D&E procedure) - 2015  #3 through #6 - spontaneous first trimester losses; #3 and #4 we managed with progesterone  #7 - 14-week spontaneous loss (D&C procedure); normal products of conception genetic microarray result - May 2017    Gyn:  Denies pelvic infections; no current/recent STDs; Hx abnormal Pap smear with subsequent colposcopy without biopsy;    PMH:  Hypothyroidism (Dr. Teri Martinez) diagnosed 1-1/2 years ago and managed with levothyroxine. Recent testing shows increased thyroperoxidase antibodies (TPOs) remote history of Hashimoto's thyroiditis as a child. Denies any known thrombophilias.     PSH:  D&C procedures (2)    FH: Denies recurrent pregnancy losses; Daughter and cousins with Type 1 diabetes; maternal-side lupus; Denies any family history     Psychosocial:  Denies tobacco and illicit drug use. Occasional alcohol. Works as a hospice RN. Physical examination:  No comprehensive physical examination today due to the nature of today's consultation. Ms. Ritu Navarro is well-dress and appears well-nourished and healthy. Laboratory/Imaging:  I have only limited outside laboratory results to review. Uterine cavity was imaged (sonohystogram) in July 2017 and was normal.  Ms. Ritu Navarro does not recall having any evaluation for recurrent pregnancy loss in the past.    Assessment:  1. Recurrent pregnancy loss (not pregnant)  2. Advanced maternal age  1. Hypothyroidism with thyroperoxidase antibodies    Discussion:  Ms. Ritu Navarro has a history of multiple first and early second trimester losses since 2015. All of her losses are with her current partner. Her partner has successfully fathered other children, and Ms. Trujillo did have one healthy child fathered by a different partner almost 23 years ago. We discussed potential etiologies of her multiple pregnancy losses. We discussed the possibility of an undiagnosed medical condition, although she has continued to good health and has completed health screenings when appropriate. She has known hypothyroidism for the past 1-1/2 years or so, but no known medical problems with that condition. She has thyroperoxidase antibodies, and thyroperoxidase antibodies have been associated with recurrent pregnancy loss; treatment is with thyroid supplement (which she is already receiving). Ms. Ritu Navarro does not recall being evaluated for antiphospholipid syndrome in the past.  I do not have any laboratory results for this evaluation.   Evaluation for antiphospholipid syndrome is currently recommend for women with recurrent pregnancy loss or with a loss in the second trimester (or later) with a normally formed fetus.  Inherited thrombophilia evaluation is not currently recommended as the association with pregnancy loss is not clear and also because there are no known therapies that have demonstrated any improvements to pregnancy loss outcomes. Current data and recommendations do NOT support the use of aspirin, heparins, or vaginal progesterone for women with unexplained recurrent pregnancy loss. Other progesterone forms and routes have not been adequately evaluated; limited data do not suggest benefits to this treatment. Although fertility has not been an issue for Ms. Trujillo, she is advised that her increasing maternal age may begin to limit her fertility. She is also advised that advanced maternal age is associated with an increased risk for pregnancy loss and also for fetal genetic/chromosomal disorders. She is aware that her age-related risks are baseline risks for her and cannot be adjusted.  surveillance, screening tests, and invasive diagnostic testing may be desirable and useful to evaluate pregnancy for women with advance maternal age. Recommendations:  1.  Ms. Alejandro Myers is provided a [facility] requisition slip to evaluate for antiphospholipid syndrome (APS):  lupus anticoagulant, anti-beta-2-glycoprotein-I IgG and IgM antibodies, anticardiolipin IgG and IgM antibodies. If this testing is positive, I recommend daily low-dose aspirin and unfractionated heparin in any future pregnancy to start at the time of fetal cardiac activity and to continue until delivery. 2.  Ms. Alejandro Myers is advised of the availability of maternal and paternal chromosomal evaluation. She will inquire about insurance benefits for herself and her partner and may consider testing either before or during the time of a future pregnancy. 3.  Ms. Alejandro Myers is encouraged to continue her thyroid supplement.   With future pregnancy, I recommend increasing levothyroxine to maintain TSH in the lower range of normal for pregnancy (within normal range for pregnancy near the hyperthyroid range). 4.  Ms. Va Hernandez is encouraged to starting taking prenatal vitamins. 5.  Assuming future pregnancy and no known explanation for her pregnancy losses: current recommendations do NOT support the use of vaginal progesterone (or other forms of progesterone), immunotherapy, aspirin, or heparin for women with unexplained recurrent pregnancy loss. 6.  Continue normal well-woman year examination to screen for diabetes, etc.    7.  Assuming future pregnancy, recommend early MFM consultation and evaluation to include genetic counseling. 8. If Ms. Trujillo has a future pregnancy that results in loss in an otherwise normal-appearing pregnancy, recommend genetic microarray evaluation of the products of conception and also parental chromosome evaluation (if not already performed, following genetic counseling). Thank you, very much, for this opportunity to provide consultation for your patient. If you have any questions or concerns, please do not hesitate to contact our office at your convenience.  ____________________________________________________________________________  Report Summary:  Impression: Preconception consultation.     Jackie Pinto M.D., Ph.D.  [facility]

## 2017-08-03 LAB
B2 GLYCOPROT1 IGG SER-ACNC: <9 GPI IGG UNITS (ref 0–20)
B2 GLYCOPROT1 IGM SER-ACNC: <9 GPI IGM UNITS (ref 0–32)
CARDIOLIPIN IGG SER IA-ACNC: <9 GPL U/ML (ref 0–14)
CARDIOLIPIN IGM SER IA-ACNC: <9 MPL U/ML (ref 0–12)
LA PPP-IMP: NORMAL
SCREEN APTT: 33.6 SEC (ref 0–51.9)
SCREEN DRVVT: 34 SEC (ref 0–47)